# Patient Record
Sex: MALE | Employment: FULL TIME | URBAN - METROPOLITAN AREA
[De-identification: names, ages, dates, MRNs, and addresses within clinical notes are randomized per-mention and may not be internally consistent; named-entity substitution may affect disease eponyms.]

---

## 2018-09-27 ENCOUNTER — OFFICE VISIT (OUTPATIENT)
Dept: URGENT CARE | Facility: MEDICAL CENTER | Age: 43
End: 2018-09-27
Payer: COMMERCIAL

## 2018-09-27 VITALS
SYSTOLIC BLOOD PRESSURE: 112 MMHG | RESPIRATION RATE: 20 BRPM | HEIGHT: 73 IN | WEIGHT: 226 LBS | OXYGEN SATURATION: 97 % | HEART RATE: 80 BPM | BODY MASS INDEX: 29.95 KG/M2 | DIASTOLIC BLOOD PRESSURE: 84 MMHG | TEMPERATURE: 97.6 F

## 2018-09-27 DIAGNOSIS — J06.9 UPPER RESPIRATORY TRACT INFECTION, UNSPECIFIED TYPE: Primary | ICD-10-CM

## 2018-09-27 PROCEDURE — G0382 LEV 3 HOSP TYPE B ED VISIT: HCPCS | Performed by: PHYSICIAN ASSISTANT

## 2018-09-27 PROCEDURE — 99203 OFFICE O/P NEW LOW 30 MIN: CPT | Performed by: PHYSICIAN ASSISTANT

## 2018-09-27 RX ORDER — BENZONATATE 200 MG/1
200 CAPSULE ORAL 3 TIMES DAILY PRN
Qty: 20 CAPSULE | Refills: 0 | Status: SHIPPED | OUTPATIENT
Start: 2018-09-27

## 2018-09-27 NOTE — PATIENT INSTRUCTIONS
1  Motrin as needed for fever  2  Push fluids  3  Take tessalon 1 every 8 hours as needed for cough  4   Follow up with PCP in 3-5 days if symptoms persist

## 2018-09-27 NOTE — PROGRESS NOTES
[id number]        NAME: Louie Oleary is a 37 y o  male  : 1975    MRN: [id number]  DATE: 2018  TIME: 7:14 PM    Assessment and Plan   Upper respiratory tract infection, unspecified type [J06 9]  1  Upper respiratory tract infection, unspecified type  benzonatate (TESSALON) 200 MG capsule         Patient Instructions     1  Motrin as needed for fever  2  Push fluids  3  Take tessalon 1 every 8 hours as needed for cough  4  Follow up with PCP in 3-5 days if symptoms persist       Chief Complaint     Chief Complaint   Patient presents with    Cold Like Symptoms     Cough and congestion x 2 days  History of Present Illness       The patient is a 25-year-old female presents with a 2 day history of nasal discharge, coughing congestion  He denies any fever, chills or body aches since the onset of his symptoms  Review of Systems   Review of Systems   Constitutional: Negative  HENT: Positive for congestion, postnasal drip and rhinorrhea  Respiratory: Positive for cough  Negative for shortness of breath  Cardiovascular: Negative for chest pain  Current Medications       Current Outpatient Prescriptions:     benzonatate (TESSALON) 200 MG capsule, Take 1 capsule (200 mg total) by mouth 3 (three) times a day as needed for cough, Disp: 20 capsule, Rfl: 0    Current Allergies     Allergies as of 2018 - Reviewed 2018   Allergen Reaction Noted    Penicillins  2018            The following portions of the patient's history were reviewed and updated as appropriate: allergies, current medications, past family history, past medical history, past social history, past surgical history and problem list      History reviewed  No pertinent past medical history  History reviewed  No pertinent surgical history  Family History   Problem Relation Age of Onset    Cancer Mother          Medications have been verified          Objective   /84 Pulse 80   Temp 97 6 °F (36 4 °C) (Temporal)   Resp 20   Ht 6' 1" (1 854 m)   Wt 103 kg (226 lb)   SpO2 97%   BMI 29 82 kg/m²        Physical Exam     Physical Exam   Constitutional: He appears well-developed and well-nourished  No distress  HENT:   Head: Normocephalic and atraumatic  Right Ear: Tympanic membrane normal    Left Ear: Tympanic membrane normal    Nose: Mucosal edema and rhinorrhea present  Mouth/Throat: Uvula is midline, oropharynx is clear and moist and mucous membranes are normal    Cardiovascular: Normal rate, regular rhythm and normal heart sounds  No murmur heard    Pulmonary/Chest: Effort normal and breath sounds normal

## 2018-10-02 ENCOUNTER — APPOINTMENT (OUTPATIENT)
Dept: RADIOLOGY | Facility: MEDICAL CENTER | Age: 43
End: 2018-10-02
Payer: COMMERCIAL

## 2018-10-02 ENCOUNTER — OFFICE VISIT (OUTPATIENT)
Dept: URGENT CARE | Facility: MEDICAL CENTER | Age: 43
End: 2018-10-02
Payer: COMMERCIAL

## 2018-10-02 VITALS
TEMPERATURE: 97.2 F | RESPIRATION RATE: 20 BRPM | DIASTOLIC BLOOD PRESSURE: 70 MMHG | SYSTOLIC BLOOD PRESSURE: 110 MMHG | HEIGHT: 73 IN | WEIGHT: 223.38 LBS | HEART RATE: 80 BPM | OXYGEN SATURATION: 97 % | BODY MASS INDEX: 29.6 KG/M2

## 2018-10-02 DIAGNOSIS — R05.9 COUGH: ICD-10-CM

## 2018-10-02 DIAGNOSIS — R05.9 COUGH: Primary | ICD-10-CM

## 2018-10-02 PROCEDURE — 71046 X-RAY EXAM CHEST 2 VIEWS: CPT

## 2018-10-02 PROCEDURE — G0382 LEV 3 HOSP TYPE B ED VISIT: HCPCS | Performed by: FAMILY MEDICINE

## 2018-10-02 PROCEDURE — 99213 OFFICE O/P EST LOW 20 MIN: CPT | Performed by: FAMILY MEDICINE

## 2018-10-02 RX ORDER — AZITHROMYCIN 250 MG/1
TABLET, FILM COATED ORAL
Qty: 6 TABLET | Refills: 0 | Status: SHIPPED | OUTPATIENT
Start: 2018-10-02 | End: 2018-10-06

## 2018-10-02 NOTE — PROGRESS NOTES
[id number]        NAME: Hannah Brown is a 37 y o  male  : 1975    MRN: [id number]  DATE: 2018  TIME: 11:50 AM    Assessment and Plan   Cough [R05]  1  Cough  XR chest pa & lateral    azithromycin (ZITHROMAX) 250 mg tablet    Bordetella pertussis / parapertussis PCR    Bordetella pertussis / parapertussis PCR     Cough concerning for bordetella pertussis infection  CXR without obvious consolidations  Prescribed Azithromycin to reduce contagiousness  Supportive therapy for cough  May f/u with PCP for further management  Patient Instructions     Follow up with PCP in 3-5 days  Proceed to  ER if symptoms worsen  Chief Complaint     Chief Complaint   Patient presents with    Cough     dry , was here last thursday with same          History of Present Illness     36 yo M who presents today due to 1 week of dry cough which has continued to persist despite taking Tessalon pearls  Reports that his wife has a mild cold  Denies any fevers, chills, SOB, CP or hemoptysis  Reports an episode of post-tussive emesis (x4) yesterday and difficulty trying to catch his breath during the coughing phases  Denies any seasonal or environmental allergies  Reports feeling fine; he just has this terrible cough  Review of Systems   Review of Systems   Constitutional: Negative for chills and fever  HENT: Positive for sneezing, sore throat, trouble swallowing and voice change  Negative for ear pain  Eyes: Negative for visual disturbance  Respiratory: Positive for cough  Negative for chest tightness  Cardiovascular: Negative for chest pain  Gastrointestinal: Positive for nausea and vomiting  Negative for abdominal pain  Skin: Negative for rash  Allergic/Immunologic: Negative for environmental allergies  Neurological: Negative for dizziness and headaches           Current Medications       Current Outpatient Prescriptions:     benzonatate (TESSALON) 200 MG capsule, Take 1 capsule (200 mg total) by mouth 3 (three) times a day as needed for cough, Disp: 20 capsule, Rfl: 0    azithromycin (ZITHROMAX) 250 mg tablet, Take 2 tablets today then 1 tablet daily x 4 days, Disp: 6 tablet, Rfl: 0    Current Allergies     Allergies as of 10/02/2018 - Reviewed 10/02/2018   Allergen Reaction Noted    Penicillins  09/27/2018            The following portions of the patient's history were reviewed and updated as appropriate: allergies, current medications, past family history, past medical history, past social history, past surgical history and problem list      History reviewed  No pertinent past medical history  History reviewed  No pertinent surgical history  Family History   Problem Relation Age of Onset    Cancer Mother          Medications have been verified  Objective   /70   Pulse 80   Temp (!) 97 2 °F (36 2 °C) (Temporal)   Resp 20   Ht 6' 1" (1 854 m)   Wt 101 kg (223 lb 6 oz)   SpO2 97%   BMI 29 47 kg/m²        Physical Exam     Physical Exam   Constitutional: He is oriented to person, place, and time  He appears well-developed and well-nourished  He appears distressed (intermittent cough)  HENT:   Head: Normocephalic and atraumatic  Right Ear: External ear normal    Left Ear: External ear normal    Nose: Nose normal    Mouth/Throat: Oropharynx is clear and moist  No oropharyngeal exudate  Eyes: Pupils are equal, round, and reactive to light  Conjunctivae and EOM are normal  Right eye exhibits no discharge  Left eye exhibits no discharge  No scleral icterus  Neck: No thyromegaly present  Cardiovascular: Normal rate and normal heart sounds  Pulmonary/Chest: Effort normal  No respiratory distress  He has no wheezes  He has rales (LL lung rales on inspiration  )  Musculoskeletal: He exhibits no edema  Lymphadenopathy:     He has no cervical adenopathy  Neurological: He is alert and oriented to person, place, and time  Skin: Skin is warm   He is not diaphoretic  No erythema  Psychiatric: He has a normal mood and affect  His behavior is normal  Judgment and thought content normal    Vitals reviewed

## 2021-04-08 DIAGNOSIS — Z23 ENCOUNTER FOR IMMUNIZATION: ICD-10-CM
